# Patient Record
Sex: MALE | Race: WHITE
[De-identification: names, ages, dates, MRNs, and addresses within clinical notes are randomized per-mention and may not be internally consistent; named-entity substitution may affect disease eponyms.]

---

## 2023-03-30 ENCOUNTER — HOSPITAL ENCOUNTER (OUTPATIENT)
Dept: HOSPITAL 47 - ORWHC2ENDO | Age: 58
Discharge: HOME | End: 2023-03-30
Attending: SURGERY
Payer: MEDICARE

## 2023-03-30 VITALS — SYSTOLIC BLOOD PRESSURE: 186 MMHG | DIASTOLIC BLOOD PRESSURE: 88 MMHG | HEART RATE: 49 BPM

## 2023-03-30 VITALS — BODY MASS INDEX: 25.8 KG/M2

## 2023-03-30 VITALS — RESPIRATION RATE: 16 BRPM | TEMPERATURE: 98.8 F

## 2023-03-30 DIAGNOSIS — Z90.49: ICD-10-CM

## 2023-03-30 DIAGNOSIS — G89.29: ICD-10-CM

## 2023-03-30 DIAGNOSIS — F17.200: ICD-10-CM

## 2023-03-30 DIAGNOSIS — Z12.11: Primary | ICD-10-CM

## 2023-03-30 DIAGNOSIS — Z98.890: ICD-10-CM

## 2023-03-30 DIAGNOSIS — Z79.899: ICD-10-CM

## 2023-03-30 DIAGNOSIS — I10: ICD-10-CM

## 2023-03-30 DIAGNOSIS — Z91.030: ICD-10-CM

## 2023-03-30 PROCEDURE — 45378 DIAGNOSTIC COLONOSCOPY: CPT

## 2023-03-30 NOTE — P.OP
Date of Procedure: 03/30/23


Preoperative Diagnosis: 


Screening colonoscopy


Postoperative Diagnosis: 


Normal colon


Procedure(s) Performed: 


Colonoscopy


Anesthesia: MAC


Surgeon: Dimitry Nguyen


Pathology: none sent


Condition: stable


Disposition: PACU


Description of Procedure: 





PROCEDURE:  The patient was placed on the endoscopy table in the lateral 

position.  Digital rectal examination was performed which revealed no 

abnormalities.  The prostate was symmetrical without nodules.  Flexible 

colonoscope was then placed in the patient's anus and passed throughout the 

entire colon.  The ileocecal valve was visualized.  The cecum, ascending, 

transverse, descending and sigmoid colon were normal.  The rectum was normal as 

well.  There were no masses, polyps or diverticula noted in the entire colon. 





SUMMARY OF FINDINGS:  


Normal colonoscopy.

## 2023-03-30 NOTE — P.GSHP
History of Present Illness


H&P Date: 03/30/23


Chief Complaint: Screening Colonoscopy





This a 57-year-old male presents today for screening colonoscopy.  Patient 

denies a significant GI complaints.





Past Medical History


Past Medical History: Hypertension


Additional Past Medical History / Comment(s): chronic back pain


History of Any Multi-Drug Resistant Organisms: None Reported


Past Surgical History: Back Surgery, Cholecystectomy


Additional Past Surgical History / Comment(s): left hip has plate and wiring in 

left leg


Past Anesthesia/Blood Transfusion Reactions: No Reported Reaction


Smoking Status: Current every day smoker





- Past Family History


  ** Mother


Family Medical History: No Reported History





Medications and Allergies


                                Home Medications











 Medication  Instructions  Recorded  Confirmed  Type


 


HYDROcodone/APAP 10-325MG [Norco 1 tab PO Q6HR PRN 03/27/23 03/30/23 History





]    


 


Ibuprofen 800 mg PO Q8H PRN 03/27/23 03/27/23 History


 


Metoprolol Succinate (ER) [Toprol 50 mg PO QAM 03/27/23 03/30/23 History





Xl]    








                                    Allergies











Allergy/AdvReac Type Severity Reaction Status Date / Time


 


bee venom protein (honey bee) Allergy  Anaphylaxis Verified 03/30/23 08:29














Surgical - Exam


                                   Vital Signs











Temp Pulse Resp BP Pulse Ox


 


 98.8 F   56 L  16   207/91   99 


 


 03/30/23 08:38  03/30/23 08:38  03/30/23 08:38  03/30/23 08:38  03/30/23 08:38














- General


well developed, well nourished, no distress





- Eyes


PERRL





- ENT


normal pinna





- Neck


no masses





- Respiratory


normal expansion





- Cardiovascular


Rhythm: regular





- Abdomen


Abdomen: soft, non tender





Assessment and Plan


Assessment: 





We'll perform screening colonoscopy